# Patient Record
Sex: FEMALE | Race: WHITE | NOT HISPANIC OR LATINO | Employment: PART TIME | URBAN - METROPOLITAN AREA
[De-identification: names, ages, dates, MRNs, and addresses within clinical notes are randomized per-mention and may not be internally consistent; named-entity substitution may affect disease eponyms.]

---

## 2024-04-15 ENCOUNTER — APPOINTMENT (OUTPATIENT)
Dept: RADIOLOGY | Facility: IMAGING CENTER | Age: 19
End: 2024-04-15
Attending: NURSE PRACTITIONER
Payer: COMMERCIAL

## 2024-04-15 ENCOUNTER — OFFICE VISIT (OUTPATIENT)
Dept: URGENT CARE | Facility: CLINIC | Age: 19
End: 2024-04-15
Payer: COMMERCIAL

## 2024-04-15 VITALS
RESPIRATION RATE: 18 BRPM | WEIGHT: 205 LBS | DIASTOLIC BLOOD PRESSURE: 72 MMHG | HEIGHT: 70 IN | TEMPERATURE: 97.3 F | BODY MASS INDEX: 29.35 KG/M2 | OXYGEN SATURATION: 99 % | HEART RATE: 79 BPM | SYSTOLIC BLOOD PRESSURE: 120 MMHG

## 2024-04-15 DIAGNOSIS — S80.02XA CONTUSION OF LEFT KNEE, INITIAL ENCOUNTER: ICD-10-CM

## 2024-04-15 DIAGNOSIS — T14.8XXA INFECTED ABRASION: ICD-10-CM

## 2024-04-15 DIAGNOSIS — L08.9 INFECTED ABRASION: ICD-10-CM

## 2024-04-15 PROCEDURE — 73564 X-RAY EXAM KNEE 4 OR MORE: CPT | Mod: TC,LT | Performed by: NURSE PRACTITIONER

## 2024-04-15 PROCEDURE — 3078F DIAST BP <80 MM HG: CPT | Performed by: NURSE PRACTITIONER

## 2024-04-15 PROCEDURE — 99204 OFFICE O/P NEW MOD 45 MIN: CPT | Performed by: NURSE PRACTITIONER

## 2024-04-15 PROCEDURE — 3074F SYST BP LT 130 MM HG: CPT | Performed by: NURSE PRACTITIONER

## 2024-04-15 RX ORDER — CEPHALEXIN 500 MG/1
500 CAPSULE ORAL 3 TIMES DAILY
Qty: 15 CAPSULE | Refills: 0 | Status: SHIPPED | OUTPATIENT
Start: 2024-04-15 | End: 2024-04-20

## 2024-04-15 RX ORDER — ESCITALOPRAM OXALATE 10 MG/1
15 TABLET ORAL DAILY
COMMUNITY

## 2024-04-16 ASSESSMENT — ENCOUNTER SYMPTOMS
CHILLS: 0
ROS SKIN COMMENTS: ABRASION LEFT KNEE
FEVER: 0

## 2024-04-16 NOTE — PROGRESS NOTES
"Subjective:   Kirsten Banks is a 18 y.o. female who presents for Knee Laceration (Patient states that she cut her knee on Saturday when she was going up and down escalators. )      HPI  Patient is an 79-year-old female presents urgent care for evaluation of a laceration of her left knee that will not stop bleeding.  Patient states that she fell on an escalator falling directly into the sharp aspects hitting her knee.  She has had persistent pain and swelling with bruising.  A small abrasion that will not stop bleeding.  She has been placing bandages.  Denies any fever or chills.  Patient is not taking any blood thinners.  She not tried any medication for the symptoms  Review of Systems   Constitutional:  Negative for chills and fever.   Musculoskeletal:  Positive for joint pain.   Skin:         Abrasion left knee       Medications:    cephALEXin Caps  escitalopram Tabs    Allergies: Patient has no allergy information on record.    Problem List: Kirsten Banks does not have a problem list on file.    Surgical History:  No past surgical history on file.    Past Social Hx: Kirsten Banks       Past Family Hx:  Kirsten Banks family history is not on file.     Problem list, medications, and allergies reviewed by myself today in Epic.     Objective:     /72   Pulse 79   Temp 36.3 °C (97.3 °F) (Temporal)   Resp 18   Ht 1.778 m (5' 10\")   Wt 93 kg (205 lb)   SpO2 99%   BMI 29.41 kg/m²     Physical Exam  Constitutional:       Appearance: Normal appearance. She is not ill-appearing or toxic-appearing.   HENT:      Head: Normocephalic.      Right Ear: External ear normal.      Left Ear: External ear normal.      Nose: Nose normal.      Mouth/Throat:      Lips: Pink.   Eyes:      General: Lids are normal.   Pulmonary:      Effort: Pulmonary effort is normal. No accessory muscle usage.   Musculoskeletal:      Cervical back: Full passive range of motion without pain.      Left knee: Swelling and bony " tenderness present. No crepitus. Decreased range of motion. Tenderness present over the patellar tendon.   Skin:     Findings: Abrasion, bruising, ecchymosis and erythema present.             Comments: Superficial abrasion to the patella region surrounding tissue edematous, ecchymotic mildly erythematous warm to the touch.   Neurological:      Mental Status: She is alert and oriented to person, place, and time.   Psychiatric:         Mood and Affect: Mood normal.         Thought Content: Thought content normal.         Assessment/Plan:     Diagnosis and associated orders:     1. Contusion of left knee, initial encounter  DX-KNEE COMPLETE 4+ LEFT      2. Infected abrasion  cephALEXin (KEFLEX) 500 MG Cap         Comments/MDM:     I independently reviewed the patient's imaging and agree with the interpretation of the radiologist.      1.  No acute traumatic bony injury.     I personally reviewed prior external notes and prior test results pertinent to today's visit.  X-ray negative for any acute findings.  Irrigated wounds with NS chlorhexidine bandage applied.  I am concerned of developing infection she will be treated with antibiotics on today's exam knee brace provided encouraged rest, ice, Tyle Motrin as needed for pain  Discussed management options, risks and benefits, and alternatives to treatment plan agreed upon.   Red flags discussed and indications to immediately call 911 or present to the Emergency Department.   Supportive care, differential diagnoses, and indications for immediate follow-up discussed with patient.    Patient expresses understanding and agrees to plan. Patient denies any other questions or concerns.              Please note that this dictation was created using voice recognition software. I have made a reasonable attempt to correct obvious errors, but I expect that there are errors of grammar and possibly content that I did not discover before finalizing the note.    This note was  electronically signed by Azeem LOUIE.